# Patient Record
Sex: FEMALE | Race: BLACK OR AFRICAN AMERICAN | NOT HISPANIC OR LATINO
[De-identification: names, ages, dates, MRNs, and addresses within clinical notes are randomized per-mention and may not be internally consistent; named-entity substitution may affect disease eponyms.]

---

## 2021-01-08 PROBLEM — Z00.00 ENCOUNTER FOR PREVENTIVE HEALTH EXAMINATION: Status: ACTIVE | Noted: 2021-01-08

## 2021-02-04 ENCOUNTER — APPOINTMENT (OUTPATIENT)
Dept: OTOLARYNGOLOGY | Facility: CLINIC | Age: 71
End: 2021-02-04

## 2021-03-16 ENCOUNTER — APPOINTMENT (OUTPATIENT)
Dept: OTOLARYNGOLOGY | Facility: CLINIC | Age: 71
End: 2021-03-16

## 2023-03-28 ENCOUNTER — APPOINTMENT (OUTPATIENT)
Dept: ORTHOPEDIC SURGERY | Facility: CLINIC | Age: 73
End: 2023-03-28
Payer: MEDICARE

## 2023-03-28 VITALS — BODY MASS INDEX: 39.37 KG/M2 | WEIGHT: 245 LBS | HEIGHT: 66 IN

## 2023-03-28 PROCEDURE — 20550 NJX 1 TENDON SHEATH/LIGAMENT: CPT | Mod: RT

## 2023-03-28 PROCEDURE — 73130 X-RAY EXAM OF HAND: CPT | Mod: RT

## 2023-03-28 PROCEDURE — 29125 APPL SHORT ARM SPLINT STATIC: CPT | Mod: 59,RT

## 2023-03-28 PROCEDURE — 73080 X-RAY EXAM OF ELBOW: CPT | Mod: RT

## 2023-03-28 PROCEDURE — 99203 OFFICE O/P NEW LOW 30 MIN: CPT | Mod: 25

## 2023-03-28 RX ORDER — LOSARTAN POTASSIUM 100 MG/1
TABLET, FILM COATED ORAL
Refills: 0 | Status: ACTIVE | COMMUNITY

## 2023-03-28 RX ORDER — SEMAGLUTIDE 1.34 MG/ML
2 INJECTION, SOLUTION SUBCUTANEOUS
Refills: 0 | Status: DISCONTINUED | COMMUNITY

## 2023-04-02 NOTE — HISTORY OF PRESENT ILLNESS
[9] : 9 [3] : 3 [Dull/Aching] : dull/aching [de-identified] : 3/28/2023: LHD 71 yo female here with pain to the right thumb with flexion x 1 mos.\par Pt also complains of right wrist and elbow pain.\par \par PMH: DM1, HTN.\par Allergies: NKDA  [FreeTextEntry1] : right elbow/r thumb/r ring finger [FreeTextEntry5] : No known injury [FreeTextEntry6] : numbness & tingling at times

## 2023-04-02 NOTE — ASSESSMENT
[FreeTextEntry1] : The patient was advised of the diagnosis. The natural history of the pathology was explained in full to the patient in layman's terms. All questions were answered. The risks and benefits of surgical and non-surgical treatment alternatives were explained in full to the patient.\par \par Pt instructed on HEP and provided rx for PT x 6 weeks.\par Left ring trigger finger CSI #1. \par Pt will utilize Gamekeeper brace and prn use of Diclofenac. \par A brace was applied.  The risks were also discussed such as compartment syndrome and skin breakdown.  Patients should call for increasing pain, worsening swelling, numbness, extremity discoloration, or any other concerns. \par Recommend golfers elbow strap.\par Pt will rto in 6 weeks for f/u care. \par

## 2023-04-02 NOTE — IMAGING
[There are no fractures, subluxations or dislocations. No significant abnormalities are seen] : There are no fractures, subluxations or dislocations. No significant abnormalities are seen [Right] : right hand [de-identified] : Right ring triggering and ttp over the A1 pulley.\par All digit are nvi with farom.\par ttp over the 1st cmc joint\par Positive Basal Joint Compression test. \par  , intrinsic strength is 5/5.\par Pinch strength 4/5 due to discomfort.\par right wrist rom is full and pain free with no ttp.\par right elbow with ttp over the medial epicondylar region \par There is no deformity to the right elbow.\par There is no ligamentous laxity.\par pain with supination is noted.\par  [FreeTextEntry1] : right 1st cmc severe oa / no acute pathology.

## 2023-05-30 ENCOUNTER — APPOINTMENT (OUTPATIENT)
Dept: ORTHOPEDIC SURGERY | Facility: CLINIC | Age: 73
End: 2023-05-30
Payer: MEDICARE

## 2023-05-30 VITALS — WEIGHT: 245 LBS | BODY MASS INDEX: 39.37 KG/M2 | HEIGHT: 66 IN

## 2023-05-30 DIAGNOSIS — M18.11 UNILATERAL PRIMARY OSTEOARTHRITIS OF FIRST CARPOMETACARPAL JOINT, RIGHT HAND: ICD-10-CM

## 2023-05-30 DIAGNOSIS — M77.01 MEDIAL EPICONDYLITIS, RIGHT ELBOW: ICD-10-CM

## 2023-05-30 DIAGNOSIS — M65.311 TRIGGER THUMB, RIGHT THUMB: ICD-10-CM

## 2023-05-30 DIAGNOSIS — M65.341 TRIGGER FINGER, RIGHT RING FINGER: ICD-10-CM

## 2023-05-30 PROCEDURE — 20550 NJX 1 TENDON SHEATH/LIGAMENT: CPT | Mod: RT

## 2023-05-30 PROCEDURE — 99213 OFFICE O/P EST LOW 20 MIN: CPT | Mod: 25

## 2023-05-30 NOTE — ASSESSMENT
[FreeTextEntry1] : The patient was advised of the diagnosis. The natural history of the pathology was explained in full to the patient in layman's terms. All questions were answered. The risks and benefits of surgical and non-surgical treatment alternatives were explained in full to the patient.\par \par \par Right ring trigger finger has resolved s/p CSI #1.\par Pt provided right thumb trigger finger CSI #1 today.\par Pt still with pain to the right thumb cmc joint. \par Continue utilize Gamekeeper brace and prn use of Diclofenac. \par \par Pt will rto in 2-3 weeks for f/u care. \par

## 2023-05-30 NOTE — IMAGING
[There are no fractures, subluxations or dislocations. No significant abnormalities are seen] : There are no fractures, subluxations or dislocations. No significant abnormalities are seen [Right] : right hand [FreeTextEntry1] : right 1st cmc severe oa / no acute pathology.  [de-identified] : Right ring no longer triggering and there is no ttp over the A1 pulley.\par All digit are nvi with farom.\par ttp over the 1st cmc joint and A1 pulley.\par Triggering to the right thumb is noted. \par Positive Basal Joint Compression test. \par  , intrinsic strength is 5/5.\par Pinch strength 4/5 due to discomfort.\par right wrist rom is full and pain free with no ttp.\par right elbow no longer  ttp over the medial epicondylar region \par There is no deformity to the right elbow.\par There is no ligamentous laxity.\par pain with supination is noted.\par

## 2023-05-30 NOTE — HISTORY OF PRESENT ILLNESS
[5] : 5 [Dull/Aching] : dull/aching [de-identified] : 5/30/2023: \par \par 3/28/2023: LHD 73 yo female here with pain to the right thumb with flexion x 1 mos.\par Pt also complains of right wrist and elbow pain.\par \par PMH: DM1, HTN.\par Allergies: NKDA  [FreeTextEntry1] : right elbow/r thumb/r ring finger [FreeTextEntry5] : No known injury [FreeTextEntry6] : numbness & tingling at times

## 2023-06-13 ENCOUNTER — APPOINTMENT (OUTPATIENT)
Dept: ORTHOPEDIC SURGERY | Facility: CLINIC | Age: 73
End: 2023-06-13